# Patient Record
Sex: FEMALE | Race: WHITE
[De-identification: names, ages, dates, MRNs, and addresses within clinical notes are randomized per-mention and may not be internally consistent; named-entity substitution may affect disease eponyms.]

---
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
## 2025-03-25 NOTE — US
EXAM DESCRIPTION: 

Breast,Bilateral: Ultrasound



CLINICAL HISTORY: 

15 yearsFemaleMyositis breast pain. Biological sister had

fibroadenoma removed in 2018. Not calculated



COMPARISON: 

Other



TECHNIQUE:

Transcutaneous scanning of the bilateral breast utilizing

gray-scale and Doppler modes. Scanning performed by the

sonographer ; observation by Dr. Gifford.

 

FINDINGS: Scanning of the bilateral breast tissue all 4

quadrants. Typical fibroglandular and fibrocystic appearance of

the tissue in all 4 quadrants bilaterally. Minimal fat abutting

the dermal layer. No dominant solid mass and no large distinct

cyst. No parenchymal edema or fluid collections. No

calcifications. No overlying skin changes.



IMPRESSION: 

No suspicious or significant imaging findings.



BI-RADS CATEGORY: 1 - NEGATIVE



RECOMMENDATIONS:

FOLLOW UP: The region of interest should be followed on clinical

grounds and if noted to change in size or character, a directed

follow-up ultrasound examination may be performed.



Written communication explaining the findings and follow-up, will

be mailed to the patient and referring health care provider. The

FINDINGS  and the FOLLOW-UP plan were reviewed in person with the

patient and guardian after the examination. 



According to the [employer], yearly mammograms

are recommended starting at age 40 and continuing as long as a

woman is in good health.  Any breast change noted on a breast

self-exam should be reported promptly to the patient's healthcare

provider.  Breast MRI is recommended for women with an

approximately 20-25% or greater lifetime risk of breast cancer,

including women with a strong family history of breast or ovarian

cancer and women who have been treated for Hodgkin's disease.



A negative mammographic report should not delay tissue diagnosis

in patients with significant clinical history or physical

findings.



Extremely dense breast tissue limits the sensitivity of digital

mammography.



Electronically signed by:  Shan Gifford MD  7/2/2020 11:23 AM CDT

Workstation: [id number] actual/chair